# Patient Record
Sex: FEMALE | URBAN - METROPOLITAN AREA
[De-identification: names, ages, dates, MRNs, and addresses within clinical notes are randomized per-mention and may not be internally consistent; named-entity substitution may affect disease eponyms.]

---

## 2023-04-17 ENCOUNTER — OFFICE VISIT (OUTPATIENT)
Dept: URBAN - METROPOLITAN AREA CLINIC 85 | Facility: CLINIC | Age: 48
End: 2023-04-17

## 2023-04-17 DIAGNOSIS — H43.393 OTHER VITREOUS OPACITIES, BILATERAL: Primary | ICD-10-CM

## 2023-04-17 DIAGNOSIS — H17.9 CORNEAL SCAR: ICD-10-CM

## 2023-04-17 PROCEDURE — 92004 COMPRE OPH EXAM NEW PT 1/>: CPT | Performed by: OPTOMETRIST

## 2023-04-17 ASSESSMENT — VISUAL ACUITY
OD: 20/20
OS: 20/20

## 2023-04-17 ASSESSMENT — KERATOMETRY
OS: 42.63
OD: 43.63

## 2023-04-17 ASSESSMENT — INTRAOCULAR PRESSURE
OD: 14
OS: 14

## 2023-04-17 NOTE — IMPRESSION/PLAN
Impression: Corneal scar, OS: H17.9. Plan: Discussed diagnosis in detail with patient. Will continue to observe condition and or symptoms. Call if [address] worsens.

## 2023-04-17 NOTE — IMPRESSION/PLAN
Impression: Other vitreous opacities, bilateral: H43.393. Plan: Reviewed with patient RD precautions, Patient will contact the office immediately if any changes noted in vision including flashes, increased floaters , veil in vision, or vision loss. Get old records.